# Patient Record
Sex: MALE | Race: BLACK OR AFRICAN AMERICAN | NOT HISPANIC OR LATINO | Employment: FULL TIME | URBAN - METROPOLITAN AREA
[De-identification: names, ages, dates, MRNs, and addresses within clinical notes are randomized per-mention and may not be internally consistent; named-entity substitution may affect disease eponyms.]

---

## 2018-12-16 ENCOUNTER — HOSPITAL ENCOUNTER (EMERGENCY)
Facility: CLINIC | Age: 28
Discharge: HOME | End: 2018-12-16
Attending: EMERGENCY MEDICINE | Admitting: EMERGENCY MEDICINE

## 2018-12-16 VITALS
SYSTOLIC BLOOD PRESSURE: 155 MMHG | RESPIRATION RATE: 16 BRPM | HEART RATE: 92 BPM | TEMPERATURE: 98.2 F | OXYGEN SATURATION: 100 % | DIASTOLIC BLOOD PRESSURE: 76 MMHG | HEIGHT: 69 IN

## 2018-12-16 DIAGNOSIS — R09.82 POST-NASAL DRIP: ICD-10-CM

## 2018-12-16 DIAGNOSIS — K21.9 GASTROESOPHAGEAL REFLUX DISEASE WITHOUT ESOPHAGITIS: ICD-10-CM

## 2018-12-16 DIAGNOSIS — R05.8 POST-TUSSIVE SYNCOPE: ICD-10-CM

## 2018-12-16 PROCEDURE — 93005 ELECTROCARDIOGRAM TRACING: CPT | Performed by: EMERGENCY MEDICINE

## 2018-12-16 PROCEDURE — 99284 EMERGENCY DEPT VISIT MOD MDM: CPT | Mod: 25 | Performed by: EMERGENCY MEDICINE

## 2018-12-16 PROCEDURE — 93010 ELECTROCARDIOGRAM REPORT: CPT | Mod: Z6 | Performed by: EMERGENCY MEDICINE

## 2018-12-16 PROCEDURE — 99283 EMERGENCY DEPT VISIT LOW MDM: CPT | Performed by: EMERGENCY MEDICINE

## 2018-12-16 RX ORDER — ALBUTEROL SULFATE 90 UG/1
2 AEROSOL, METERED RESPIRATORY (INHALATION) EVERY 4 HOURS PRN
Qty: 1 INHALER | Refills: 0 | Status: SHIPPED | OUTPATIENT
Start: 2018-12-16 | End: 2019-01-15

## 2018-12-16 RX ORDER — FLUTICASONE PROPIONATE 50 MCG
1 SPRAY, SUSPENSION (ML) NASAL DAILY
Qty: 9.9 ML | Refills: 1 | Status: SHIPPED | OUTPATIENT
Start: 2018-12-16 | End: 2019-01-15

## 2018-12-16 ASSESSMENT — ENCOUNTER SYMPTOMS
ROS GI COMMENTS: ACID REFLUX
FEVER: 0
SORE THROAT: 0
COUGH: 1
SINUS PAIN: 0
RHINORRHEA: 1

## 2018-12-16 NOTE — ED AVS SNAPSHOT
[facility], [place], Emergency Department  [address] [place] [id number]  Phone:  [phone]                                    Larry Nunez   MRN: [id number]    Department:  [facility], Emergency Department   Date of Visit:  12/16/2018           After Visit Summary Signature Page    I have received my discharge instructions, and my questions have been answered. I have discussed any challenges I see with this plan with the nurse or doctor.    ..........................................................................................................................................  Patient/Patient Representative Signature      ..........................................................................................................................................  Patient Representative Print Name and Relationship to Patient    ..................................................               ................................................  Date                                   Time    ..........................................................................................................................................  Reviewed by Signature/Title    ...................................................              ..............................................  Date                                               Time          22EPIC Rev 08/18

## 2018-12-17 LAB — INTERPRETATION ECG - MUSE: NORMAL

## 2018-12-17 NOTE — ED TRIAGE NOTES
"Pt presents to ED with c/o syncopal episode and persistent cough. Pt reports having cough for last 2 months. When cough started pt had flu-like symptoms, but cough has remained. 3 days ago and today pt had coughing attack that resulted in \"passing out\".   "

## 2018-12-17 NOTE — ED PROVIDER NOTES
"    [place] EMERGENCY DEPARTMENT ([facility])  12/16/18   [facility]  History     Chief Complaint   Patient presents with     Loss of Consciousness     Cough     The history is provided by the patient and a significant other.     Larry Nunez is a 28 year old male who presents with coughing fits to the point of syncopal spell.  Patient has had persistent cough for the past 2 months.  Initially symptoms started with sore throat, high fever, vomiting as well as cough 2 months ago. The flu symptoms have subsided while the cough has persisted.  Lately patient has had significant coughing fits with lightheadedness.  He had a coughing fit 3 days ago that resulted in him with syncopal spell. He didn't get this evaluated or tell anyone about this. He had recurrence of this today and significant other brought him in to be seen.  He now presents for evaluation. The coughing fits are induced by cold air, hard laughing or spontaneously. The coughing fits are more intense than previously. The cough is productive of mucus.  He has had rhinorrhea for several weeks. No sore throat, fever. He denies any history of smoking. No history of asthma. He has occasional ear congestion, no congestion today. He is healthy at baseline. He is on no medications other than multivitamin with probiotics. No history of surgery.  No swelling in his legs or feet. He notes his father had a heart attack at age 30 but otherwise no history of coronary artery disease in family. He has occasional drink from time to time. No drug use. He does not have a primary care provider or primary care clinic. He notes occasional abdominal gas buildup, acid indigestion (likes spicy foods) that he treats by \"burnham up.\" He doesn't know if he has any allergies, but notes feeling itchy with cough around longhaired cats. He has been around shorthaired cats without issue.     I have reviewed the Medications, Allergies, Past Medical and Surgical History, and " "Social History in the Epic system.  PAST MEDICAL HISTORY: History reviewed. No pertinent past medical history.    PAST SURGICAL HISTORY: History reviewed. No pertinent surgical history.    FAMILY HISTORY: History reviewed. No pertinent family history.    SOCIAL HISTORY:   Social History     Tobacco Use     Smoking status: Never Smoker     Smokeless tobacco: Never Used   Substance Use Topics     Alcohol use: Yes     Comment: sometimes          Medication List      START taking these medications    albuterol 108 (90 Base) MCG/ACT inhaler  Commonly known as:  PROAIR HFA  Inhale 2 puffs into the lungs every 4 hours as needed for shortness of breath / dyspnea     fluticasone 50 MCG/ACT nasal spray  Commonly known as:  FLONASE  Spray 1 spray into both nostrils daily     ranitidine 150 MG tablet  Commonly known as:  ZANTAC  Take 1 tablet (150 mg) by mouth 2 times daily for 14 days           Where to Get Your Medications      You can get these medications from any pharmacy    Bring a paper prescription for each of these medications    albuterol 108 (90 Base) MCG/ACT inhaler    fluticasone 50 MCG/ACT nasal spray    ranitidine 150 MG tablet          No Known Allergies   Review of Systems   Constitutional: Negative for fever.   HENT: Positive for ear pain, postnasal drip and rhinorrhea. Negative for sinus pain and sore throat.    Respiratory: Positive for cough.    Cardiovascular: Negative for chest pain and leg swelling.   Gastrointestinal:        Acid reflux   Skin: Negative for rash.   Neurological: Positive for syncope (after coughing fit).   All other systems reviewed and are negative.      Physical Exam   BP: (!) 175/100  Pulse: 109  Temp: 98.2  F (36.8  C)  Resp: 20  Height: 175.3 cm (5' 9\")  SpO2: 96 %      Physical Exam   Gen:A&Ox3, no acute distress  HEENT:PERRL, no facial tenderness or wounds, head atraumatic, oropharynx with cobblestoning, mucous membranes moist, TMs clear bilaterally, no facial tenderness, no " facial edema  Neck:no bony tenderness or step offs, no JVD, trachea midline, no bruits  Back: no CVA tenderness, no midline bony tenderness  CV:RRR without murmurs  PULM:Clear to auscultation bilaterally, no wheezing, work of breathing normal, no coughing fits during the exam.   Abd:soft, nontender, nondistended. Bowel sounds present and normal  UE:No traumatic injuries, skin normal  LE:no traumatic injuries, skin normal, no LE edema, no calf tenderness  Neuro:CN II-XII intact, strength 5/5 throughout, gait stable.   Skin: no rashes or ecchymoses      ED Course        Procedures             EKG Interpretation:      Interpreted by Nilda Martinez  Time reviewed: 8:00pm  Symptoms at time of EKG: syncope   Rhythm: normal sinus   Rate: 94  Axis: normal  Ectopy: none  Conduction: normal. CO 168ms.   ST Segments/ T Waves: No ST-T wave changes. No brudaga pattern, no delta wave.   Q Waves: none  Comparison to prior: No old EKG available    Clinical Impression: normal EKG      Critical Care time:  none             Labs Ordered and Resulted from Time of ED Arrival Up to the Time of Departure from the ED - No data to display         Assessments & Plan (with Medical Decision Making)   27 yo M presenting with coughing fits, associated with a few episodes of tussive syncope.   Vitals notable for moderate HTN with BP 170s on arrival.  EKG unremarkable, specifically without brugada, LVH, or WPW patterns. No blocks. Not likely to benefit from lab testing today as metabolic causes of syncope very unlikely.   Pt and I discussed performing a CXR, but give his clear lungs on exam, we have decided to defer this unless he does not improve with the following supportive careL  By hx coughing spells seem to be 2/2 to oropharyngeal irritation due to post-nasal drip and GERD. Initially triggered by a URI but now also possibly allergic triggered.   Will start a course of flonase nasal spray daily, PRN antihistamine, ranitidine BID and  albuterol PRN for bronchospasm.     Referred to establish primary care for follow up and reassessment of HTN noted today. PT would prefer to follow up at [facility] clinic.     Discharged.     I have reviewed the nursing notes.    I have reviewed the findings, diagnosis, plan and need for follow up with the patient.       Medication List      Started    albuterol 108 (90 Base) MCG/ACT inhaler  Commonly known as:  PROAIR HFA  2 puffs, Inhalation, EVERY 4 HOURS PRN     fluticasone 50 MCG/ACT nasal spray  Commonly known as:  FLONASE  1 spray, Both Nostrils, DAILY     ranitidine 150 MG tablet  Commonly known as:  ZANTAC  150 mg, Oral, 2 TIMES DAILY            Final diagnoses:   Post-nasal drip   Gastroesophageal reflux disease without esophagitis   Post-tussive syncope     I, Karis Mckeon, am serving as a trained medical scribe to document services personally performed by Nilda Martinez MD based on the provider's statements to me on December 16, 2018.  This document has been checked and approved by the attending provider.    I, Nilda Martinez MD, was physically present and have reviewed and verified the accuracy of this note documented by Karis Mckeon, medical scribe.       12/16/2018   [facility], [place], EMERGENCY DEPARTMENT    MD RUBIO Saleh Katrina Anne, MD  12/16/18 2021

## 2018-12-17 NOTE — DISCHARGE INSTRUCTIONS
Thank you for coming to the [facility] Emergency Department.     Start Flonase nasal spray in each nostril once daily. If additional decongestion is needed, try over the counter Claritin.     For acid reflux, start Ranitidine 150mg twice daily for 2 weeks. If this is helping, you can get this over the counter and continue it as needed.   These above medications may take a few weeks to really show you their full benefit. In the meantime, if you develop heavy coughing fits, take 2 puffs on the albuterol inhaler as needed, up to every 4 hours.     Please make and appointment to establish primary care. You will need a recheck of your blood pressure and a reassessment of your coughing.   [address]  [address]  [place] [address]    Appointments:  [phone]  Clinic: [phone]

## 2022-04-06 ENCOUNTER — NURSE TRIAGE (OUTPATIENT)
Dept: ADMINISTRATIVE | Facility: CLINIC | Age: 32
End: 2022-04-06

## 2022-04-07 NOTE — TELEPHONE ENCOUNTER
Pt resides in [place] and informed that he could not be triaged due to not being in a NCL state, pt advised to search out medical advice locally or if having an emergency to call 911 or go to his nearest ED. [facility] care ameena offered as well. Pt verbalized understanding.    Reason for Disposition   Unable to complete triage due to phone connection issues    Protocols used: NO CONTACT OR DUPLICATE CONTACT CALL-A-AH

## 2023-09-24 ENCOUNTER — APPOINTMENT (OUTPATIENT)
Dept: CT IMAGING | Facility: CLINIC | Age: 33
End: 2023-09-24
Attending: EMERGENCY MEDICINE
Payer: COMMERCIAL

## 2023-09-24 ENCOUNTER — HOSPITAL ENCOUNTER (EMERGENCY)
Facility: CLINIC | Age: 33
Discharge: HOME | End: 2023-09-24
Attending: EMERGENCY MEDICINE | Admitting: EMERGENCY MEDICINE
Payer: COMMERCIAL

## 2023-09-24 VITALS
HEIGHT: 68 IN | DIASTOLIC BLOOD PRESSURE: 99 MMHG | WEIGHT: 280 LBS | HEART RATE: 83 BPM | OXYGEN SATURATION: 99 % | BODY MASS INDEX: 42.44 KG/M2 | RESPIRATION RATE: 16 BRPM | TEMPERATURE: 98.7 F | SYSTOLIC BLOOD PRESSURE: 154 MMHG

## 2023-09-24 DIAGNOSIS — R56.9 SEIZURE-LIKE ACTIVITY (H): ICD-10-CM

## 2023-09-24 DIAGNOSIS — I10 HYPERTENSION, UNSPECIFIED TYPE: ICD-10-CM

## 2023-09-24 LAB
ANION GAP SERPL CALCULATED.3IONS-SCNC: 15 MMOL/L (ref 7–15)
ATRIAL RATE - MUSE: 122 BPM
BASOPHILS # BLD AUTO: 0 10E3/UL (ref 0–0.2)
BASOPHILS NFR BLD AUTO: 1 %
BUN SERPL-MCNC: 11.6 MG/DL (ref 6–20)
CALCIUM SERPL-MCNC: 9.1 MG/DL (ref 8.6–10)
CHLORIDE SERPL-SCNC: 98 MMOL/L (ref 98–107)
CREAT SERPL-MCNC: 1.03 MG/DL (ref 0.67–1.17)
DEPRECATED HCO3 PLAS-SCNC: 23 MMOL/L (ref 22–29)
DIASTOLIC BLOOD PRESSURE - MUSE: NORMAL MMHG
EGFRCR SERPLBLD CKD-EPI 2021: >90 ML/MIN/1.73M2
EOSINOPHIL # BLD AUTO: 0.1 10E3/UL (ref 0–0.7)
EOSINOPHIL NFR BLD AUTO: 1 %
ERYTHROCYTE [DISTWIDTH] IN BLOOD BY AUTOMATED COUNT: 12.3 % (ref 10–15)
GLUCOSE SERPL-MCNC: 112 MG/DL (ref 70–99)
HCT VFR BLD AUTO: 42.5 % (ref 40–53)
HGB BLD-MCNC: 13.7 G/DL (ref 13.3–17.7)
IMM GRANULOCYTES # BLD: 0 10E3/UL
IMM GRANULOCYTES NFR BLD: 0 %
INTERPRETATION ECG - MUSE: NORMAL
LYMPHOCYTES # BLD AUTO: 1.7 10E3/UL (ref 0.8–5.3)
LYMPHOCYTES NFR BLD AUTO: 22 %
MCH RBC QN AUTO: 28.8 PG (ref 26.5–33)
MCHC RBC AUTO-ENTMCNC: 32.2 G/DL (ref 31.5–36.5)
MCV RBC AUTO: 90 FL (ref 78–100)
MONOCYTES # BLD AUTO: 0.6 10E3/UL (ref 0–1.3)
MONOCYTES NFR BLD AUTO: 7 %
NEUTROPHILS # BLD AUTO: 5.6 10E3/UL (ref 1.6–8.3)
NEUTROPHILS NFR BLD AUTO: 69 %
NRBC # BLD AUTO: 0 10E3/UL
NRBC BLD AUTO-RTO: 0 /100
P AXIS - MUSE: 55 DEGREES
PLATELET # BLD AUTO: 239 10E3/UL (ref 150–450)
POTASSIUM SERPL-SCNC: 3.8 MMOL/L (ref 3.4–5.3)
PR INTERVAL - MUSE: 166 MS
PROLACTIN SERPL 3RD IS-MCNC: 7 NG/ML (ref 4–15)
QRS DURATION - MUSE: 94 MS
QT - MUSE: 316 MS
QTC - MUSE: 450 MS
R AXIS - MUSE: 40 DEGREES
RBC # BLD AUTO: 4.75 10E6/UL (ref 4.4–5.9)
SODIUM SERPL-SCNC: 136 MMOL/L (ref 136–145)
SYSTOLIC BLOOD PRESSURE - MUSE: NORMAL MMHG
T AXIS - MUSE: 28 DEGREES
VENTRICULAR RATE- MUSE: 122 BPM
WBC # BLD AUTO: 8 10E3/UL (ref 4–11)

## 2023-09-24 PROCEDURE — 70450 CT HEAD/BRAIN W/O DYE: CPT

## 2023-09-24 PROCEDURE — 93005 ELECTROCARDIOGRAM TRACING: CPT

## 2023-09-24 PROCEDURE — 99285 EMERGENCY DEPT VISIT HI MDM: CPT | Mod: 25

## 2023-09-24 PROCEDURE — 85025 COMPLETE CBC W/AUTO DIFF WBC: CPT | Performed by: EMERGENCY MEDICINE

## 2023-09-24 PROCEDURE — 36415 COLL VENOUS BLD VENIPUNCTURE: CPT | Performed by: EMERGENCY MEDICINE

## 2023-09-24 PROCEDURE — 80048 BASIC METABOLIC PNL TOTAL CA: CPT | Performed by: EMERGENCY MEDICINE

## 2023-09-24 PROCEDURE — 84146 ASSAY OF PROLACTIN: CPT | Performed by: EMERGENCY MEDICINE

## 2023-09-24 RX ORDER — LISINOPRIL 10 MG/1
10 TABLET ORAL DAILY
Qty: 30 TABLET | Refills: 0 | Status: SHIPPED | OUTPATIENT
Start: 2023-09-24

## 2023-09-24 ASSESSMENT — ACTIVITIES OF DAILY LIVING (ADL)
ADLS_ACUITY_SCORE: 33
ADLS_ACUITY_SCORE: 35

## 2023-09-24 NOTE — ED TRIAGE NOTES
Pt states he had an episode of passing out and a seizure as witnessed by friends an hour ago. Pt complains of tingling in left arm and foot.

## 2023-09-24 NOTE — ED PROVIDER NOTES
"  History     Chief Complaint:  Syncope and Seizures       HPI   Larry Nunez is a 33 year old male with history of hypertension who presents with syncope and seizures. The patient reports that he was talking on the phone and walking into a room when he had a witnessed syncopal episode where he hit his head. His friend who witnessed the episode said he was having seizures while passing out. The patient endorse tingling in his left arm and foot after the episode. He notes tthat he has not eaten a lot recently because he was busy. He also adds that he was drinking some beers while watching football with a friend. The patient is otherwise healthy with some mild high blood pressure.      Independent Historian:   None - Patient Only    Review of External Notes:         Medications:    Norvasc    Past Medical History:    Hypertension    Physical Exam   Patient Vitals for the past 24 hrs:   BP Temp Temp src Pulse Resp SpO2 Height Weight   09/24/23 1332 (!) 154/99 -- -- -- -- -- -- --   09/24/23 1056 (!) 176/110 98.7  F (37.1  C) Temporal 83 16 99 % 1.727 m (5' 8\") 127 kg (280 lb)        Physical Exam  Vitals reviewed.   Constitutional:       Appearance: He is obese.   HENT:      Head: Normocephalic.      Right Ear: Tympanic membrane normal.      Left Ear: Tympanic membrane normal.      Nose: Nose normal.      Mouth/Throat:      Mouth: Mucous membranes are moist.   Cardiovascular:      Rate and Rhythm: Normal rate and regular rhythm.   Pulmonary:      Effort: Pulmonary effort is normal.   Musculoskeletal:         General: Normal range of motion.   Skin:     General: Skin is warm.      Capillary Refill: Capillary refill takes less than 2 seconds.   Neurological:      General: No focal deficit present.      Mental Status: He is alert and oriented to person, place, and time.   Psychiatric:         Mood and Affect: Mood normal.           Emergency Department Course   EKG:  ECG results from 09/24/23   EKG 12-lead, tracing " only     Value    Systolic Blood Pressure     Diastolic Blood Pressure     Ventricular Rate 122    Atrial Rate 122    SC Interval 166    QRS Duration 94        QTc 450    P Axis 55    R AXIS 40    T Axis 28    Interpretation ECG      Sinus tachycardia  Otherwise normal ECG  When compared with ECG of 16-DEC-2018 19:41,  Nonspecific T wave abnormality now evident in Lateral leads  Confirmed by GENERATED REPORT, COMPUTER ([id number]),  Angie Caba ([id number]) on 9/24/2023 11:27:42 AM         Imaging:  Head CT w/o contrast   Final Result   IMPRESSION:   1.  Normal head CT.         Report per radiology    Laboratory:  Labs Ordered and Resulted from Time of ED Arrival to Time of ED Departure   BASIC METABOLIC PANEL - Abnormal       Result Value    Sodium 136      Potassium 3.8      Chloride 98      Carbon Dioxide (CO2) 23      Anion Gap 15      Urea Nitrogen 11.6      Creatinine 1.03      Calcium 9.1      Glucose 112 (*)     GFR Estimate >90     CBC WITH PLATELETS AND DIFFERENTIAL    WBC Count 8.0      RBC Count 4.75      Hemoglobin 13.7      Hematocrit 42.5      MCV 90      MCH 28.8      MCHC 32.2      RDW 12.3      Platelet Count 239      % Neutrophils 69      % Lymphocytes 22      % Monocytes 7      % Eosinophils 1      % Basophils 1      % Immature Granulocytes 0      NRBCs per 100 WBC 0      Absolute Neutrophils 5.6      Absolute Lymphocytes 1.7      Absolute Monocytes 0.6      Absolute Eosinophils 0.1      Absolute Basophils 0.0      Absolute Immature Granulocytes 0.0      Absolute NRBCs 0.0     PROLACTIN          Emergency Department Course & Assessments:      Interventions:  Medications - No data to display     Assessments:  1113 I obtained history and examined the patient as noted above  1330 I rechecked the patient and explained findings    Independent Interpretation (X-rays, CTs, rhythm strip):      Consultations/Discussion of Management or Tests:  None        Social Determinants of Health affecting care:    None    Disposition:  The patient was discharged to home.     Impression & Plan      Medical Decision Making:  Patient presents with an episode of loss of time.  There was some shaking associated with it and some vague tingling in his arm and leg but this since is gone away.  On arrival patient is awake and alert with a GCS of 15 with a normal NIH and no numbness or tingling.  CT of the head is negative for mass bleed or shift.  Lab work is unremarkable blood pressure was high but improved with time to 154/99.  Doubt press.  Doubt stroke as patient symptoms have resolved and is young.  Will require further seizure work-up prolactin sent but recommend outpatient follow-up.  Referred to neurology.  Patient is asked to avoid driving operating machinery or climbing ladders and return with recurrent episodes to consider empiric antiepileptic treatment until follow-up with neurology      Diagnosis:    ICD-10-CM    1. Hypertension, unspecified type  I10       2. Seizure-like activity (H)  R56.9            Discharge Medications:  New Prescriptions    LISINOPRIL (ZESTRIL) 10 MG TABLET    Take 1 tablet (10 mg) by mouth daily          Scribe Disclosure:  Magnus TONG, am serving as a scribe at 11:16 AM on 9/24/2023 to document services personally performed by Dennis Page MD based on my observations and the provider's statements to me.   9/24/2023   Dennis Page MD Goodman, Brian Samuel, MD  09/24/23 5001

## 2023-09-24 NOTE — DISCHARGE INSTRUCTIONS
As we have discussed we have done a CT of the head as well as lab work and these are all normal.  Episodes of loss of time can be passing out but if you were shaking during it this could be a seizure.  The emergency room cannot rule out a seizure completely is full testing for this includes an EEG which we do not have to do out of the emergency room.  Please make an appointment with a neurologist for follow-up for first-time seizure.  Avoid driving and climbing ladders or operating machinery for at least a week to 2 weeks or until you are cleared by a neurologist.  Return with any recurrent episodes to consider medication.